# Patient Record
(demographics unavailable — no encounter records)

---

## 2024-11-04 NOTE — HISTORY OF PRESENT ILLNESS
[FreeTextEntry8] : 70 yo female presents for follow up of T2DM, HLD, hypothyroid, hepatic steatosis. reports feeling well. Denies fever, chills, cp, palpitations, sob, nvcd.

## 2024-12-18 NOTE — HISTORY OF PRESENT ILLNESS
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, does not have a car chart reviewed labs reviewed 11/2024 a1c 8.2, ldl 93, vit d 36, tsh 1.64, clara neg feels depressed bc of cardiac cath having some lip numbness on blood thinners recently likes the bobby but forgot the reader today  12/12th going to the hospital for cardiac catherization   regimen LT4 25mcg daily, taking consistently metformin 1000mg BID- no issues actos 30 mg daily- no issues farxiga 5mg daily- running low humalog 5 TID tresiba 20 units AM ONLY past meds off ozempic due to not feeling good did not want to use the vgo  FS in office: 149 bobby reader at home  diet: 830-9 has whole grain bread, lunch salad, snack with fruit or cheese stick, dinner salad/meat, does not eat out exercise: limited weight: stable  eye doctor: b/l DR siddiqi/ edema, gets laser treatments, 9/2024 neuropathy: yes, on gabapentin, sees podiatry CKD: no but has abnormal clara other (PVD/MI/CVA): none non smoker

## 2024-12-18 NOTE — ASSESSMENT
[Long Term Vascular Complications] : long term vascular complications of diabetes [Importance of Diet and Exercise] : importance of diet and exercise to improve glycemic control, achieve weight loss and improve cardiovascular health [Exercise/Effect on Glucose] : exercise/effect on glucose [Action and use of Insulin] : action and use of short and long-acting insulin [Insulin Self-Administration] : insulin self-administration [Retinopathy Screening] : Patient was referred to ophthalmology for retinopathy screening [FreeTextEntry1] : 69F, [place] speaking obese w/ longstanding T2DM cb b/l DR siddiqi/ edema/microalbuminemia/neuropathy/CAD, HLD, HTN, vitamin D deficiency, and hypothyroidism here for follow up needs a lot of education and repetition rtc in 3 months with sil rtc in 6 months with me can consider 1mg dst if has weight gain or still has fatigue Low Ferritin- defer to pmd  ***going for angiogram 12/12. The above patient has been under my care for the treatment of diabetes. She is stable from a diabetes standpoint to proceed with the planned procedure on 12/12. Recent blood glucose levels show that her diabetes is in good control. If there are any further questions, please call the office at [phone]. Patient was given instructions to take only tresiba 10 units night prior to procedure, hold farxiga for 3 days and not to take actos, metformin or humalog the day of the procedure.***  T2DM cb triopathy/CAD/hypoglycemia - suboptimal but no data to adjust, goal A1c<7 based on age Likely due to dietary indiscretion and nature of disease. MUST rotate sites- REMINDED AGAIN up to date with eye doctor on Alistair continue metformin 2000mg daily continue humalog 5 units before each meal continue tresiba 20 units AM ONLY continue actos 30mg daily due to post prandial highs and hepatic steatosis (in 2021 but looks like might have improved since then based on recent abdomen sono/ct), no hx or fhx of bladder cancer continue gabapentin 100/200mg for neuropathy needs to drink more fluids other does not want vgo had issues with ozempic, does not want to retry  Hypothyroidism- normal tsh. continue LT4 25mcg. 12/2021 thyroid ultrasound heterogenous, no need to repeat  HLD- ldl<70, on statin, defer to cardiology  HTN- BP acceptable. continue ACEi  vitamin D deficiency- monitor. normalized  vitamin B12 def- continue B12 1000mcg   ----------- This patient with diabetes - Is injecting insulin 3 or more times per day - Is currently on CGM, testing glucose continuously - Has been seen in the office by a provider within the last six months to review CGM data with their provider - Is adjusting multi-dose insulin daily using a sliding scale or correction factor as documented in the medical record CGM is medically necessary for this pt. - CGM will improve/maintain A1c - CGM will reduce hypoglycemic events Alistair and Dexcom each require one test strip daily to use in case of sensor failure or for blood glucose verification or during sensor warmup.

## 2024-12-18 NOTE — ASSESSMENT
[Long Term Vascular Complications] : long term vascular complications of diabetes [Importance of Diet and Exercise] : importance of diet and exercise to improve glycemic control, achieve weight loss and improve cardiovascular health [Exercise/Effect on Glucose] : exercise/effect on glucose [Action and use of Insulin] : action and use of short and long-acting insulin [Insulin Self-Administration] : insulin self-administration [Retinopathy Screening] : Patient was referred to ophthalmology for retinopathy screening [FreeTextEntry1] : 69F, Canadian speaking obese w/ longstanding T2DM cb b/l DR siddiqi/ edema/microalbuminemia/neuropathy/CAD, HLD, HTN, vitamin D deficiency, and hypothyroidism here for follow up needs a lot of education and repetition rtc in 3 months with sil rtc in 6 months with me can consider 1mg dst if has weight gain or still has fatigue Low Ferritin- defer to pmd  ***going for angiogram 12/12. The above patient has been under my care for the treatment of diabetes. She is stable from a diabetes standpoint to proceed with the planned procedure on 12/12. Recent blood glucose levels show that her diabetes is in good control. If there are any further questions, please call the office at [phone]. Patient was given instructions to take only tresiba 10 units night prior to procedure, hold farxiga for 3 days and not to take actos, metformin or humalog the day of the procedure.***  T2DM cb triopathy/CAD/hypoglycemia - suboptimal but no data to adjust, goal A1c<7 based on age Likely due to dietary indiscretion and nature of disease. MUST rotate sites- REMINDED AGAIN up to date with eye doctor on Alistair continue metformin 2000mg daily continue humalog 5 units before each meal continue tresiba 20 units AM ONLY continue actos 30mg daily due to post prandial highs and hepatic steatosis (in 2021 but looks like might have improved since then based on recent abdomen sono/ct), no hx or fhx of bladder cancer continue gabapentin 100/200mg for neuropathy needs to drink more fluids other does not want vgo had issues with ozempic, does not want to retry  Hypothyroidism- normal tsh. continue LT4 25mcg. 12/2021 thyroid ultrasound heterogenous, no need to repeat  HLD- ldl<70, on statin, defer to cardiology  HTN- BP acceptable. continue ACEi  vitamin D deficiency- monitor. normalized  vitamin B12 def- continue B12 1000mcg   ----------- This patient with diabetes - Is injecting insulin 3 or more times per day - Is currently on CGM, testing glucose continuously - Has been seen in the office by a provider within the last six months to review CGM data with their provider - Is adjusting multi-dose insulin daily using a sliding scale or correction factor as documented in the medical record CGM is medically necessary for this pt. - CGM will improve/maintain A1c - CGM will reduce hypoglycemic events Alistair and Dexcom each require one test strip daily to use in case of sensor failure or for blood glucose verification or during sensor warmup.

## 2024-12-18 NOTE — HISTORY OF PRESENT ILLNESS
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, does not have a car chart reviewed labs reviewed 11/2024 a1c 8.2, ldl 93, vit d 36, tsh 1.64, clara neg feels depressed bc of cardiac cath having some lip numbness on blood thinners recently likes the bobby but forgot the reader today  12/12th going to the hospital for cardiac catherization   regimen LT4 25mcg daily, taking consistently metformin 1000mg BID- no issues actos 30 mg daily- no issues farxiga 5mg daily- running low humalog 5 TID tresiba 20 units AM ONLY past meds off ozempic due to not feeling good did not want to use the vgo  FS in office: 149 bobby reader at home  diet: 830-9 has whole grain bread, lunch salad, snack with fruit or cheese stick, dinner salad/meat, does not eat out exercise: limited weight: stable  eye doctor: b/l DR siddiqi/ edema, gets laser treatments, 9/2024 neuropathy: yes, on gabapentin, sees podiatry CKD: no but has abnormal clara other (PVD/MI/CVA): none non smoker

## 2024-12-18 NOTE — HISTORY OF PRESENT ILLNESS
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, does not have a car chart reviewed labs reviewed 11/2024 a1c 8.2, ldl 93, vit d 36, tsh 1.64, clara neg feels depressed bc of cardiac cath having some lip numbness on blood thinners recently likes the bobby but forgot the reader today  12/12th going to the hospital for cardiac catherization   regimen LT4 25mcg daily, taking consistently metformin 1000mg BID- no issues actos 30 mg daily- no issues farxiga 5mg daily- running low humalog 5 TID tresiba 20 units AM ONLY past meds off ozempic due to not feeling good did not want to use the vgo  FS in office: 149 bobby reader at home  diet: 830-9 has whole grain bread, lunch salad, snack with fruit or cheese stick, dinner salad/meat, does not eat out exercise: limited weight: stable  eye doctor: b/l DR siddiqi/ edema, gets laser treatments, 9/2024 neuropathy: yes, on gabapentin, sees podiatry CKD: no but has abnormal clara other (PVD/MI/CVA): none non smoker

## 2024-12-18 NOTE — ASSESSMENT
[Long Term Vascular Complications] : long term vascular complications of diabetes [Importance of Diet and Exercise] : importance of diet and exercise to improve glycemic control, achieve weight loss and improve cardiovascular health [Exercise/Effect on Glucose] : exercise/effect on glucose [Action and use of Insulin] : action and use of short and long-acting insulin [Insulin Self-Administration] : insulin self-administration [Retinopathy Screening] : Patient was referred to ophthalmology for retinopathy screening [FreeTextEntry1] : 69F, Guatemalan speaking obese w/ longstanding T2DM cb b/l DR siddiqi/ edema/microalbuminemia/neuropathy/CAD, HLD, HTN, vitamin D deficiency, and hypothyroidism here for follow up needs a lot of education and repetition rtc in 3 months with sil rtc in 6 months with me can consider 1mg dst if has weight gain or still has fatigue Low Ferritin- defer to pmd  ***going for angiogram 12/12. The above patient has been under my care for the treatment of diabetes. She is stable from a diabetes standpoint to proceed with the planned procedure on 12/12. Recent blood glucose levels show that her diabetes is in good control. If there are any further questions, please call the office at [phone]. Patient was given instructions to take only tresiba 10 units night prior to procedure, hold farxiga for 3 days and not to take actos, metformin or humalog the day of the procedure.***  T2DM cb triopathy/CAD/hypoglycemia - suboptimal but no data to adjust, goal A1c<7 based on age Likely due to dietary indiscretion and nature of disease. MUST rotate sites- REMINDED AGAIN up to date with eye doctor on Alistair continue metformin 2000mg daily continue humalog 5 units before each meal continue tresiba 20 units AM ONLY continue actos 30mg daily due to post prandial highs and hepatic steatosis (in 2021 but looks like might have improved since then based on recent abdomen sono/ct), no hx or fhx of bladder cancer continue gabapentin 100/200mg for neuropathy needs to drink more fluids other does not want vgo had issues with ozempic, does not want to retry  Hypothyroidism- normal tsh. continue LT4 25mcg. 12/2021 thyroid ultrasound heterogenous, no need to repeat  HLD- ldl<70, on statin, defer to cardiology  HTN- BP acceptable. continue ACEi  vitamin D deficiency- monitor. normalized  vitamin B12 def- continue B12 1000mcg   ----------- This patient with diabetes - Is injecting insulin 3 or more times per day - Is currently on CGM, testing glucose continuously - Has been seen in the office by a provider within the last six months to review CGM data with their provider - Is adjusting multi-dose insulin daily using a sliding scale or correction factor as documented in the medical record CGM is medically necessary for this pt. - CGM will improve/maintain A1c - CGM will reduce hypoglycemic events Alistair and Dexcom each require one test strip daily to use in case of sensor failure or for blood glucose verification or during sensor warmup.

## 2024-12-18 NOTE — PHYSICAL EXAM
[Alert] : alert [Well Nourished] : well nourished [Obese] : obese [No Acute Distress] : no acute distress [Well Developed] : well developed [Normal Sclera/Conjunctiva] : normal sclera/conjunctiva [EOMI] : extra ocular movement intact [No Proptosis] : no proptosis [Normal Oropharynx] : the oropharynx was normal [Thyroid Not Enlarged] : the thyroid was not enlarged [No Thyroid Nodules] : no palpable thyroid nodules [No Respiratory Distress] : no respiratory distress [No Accessory Muscle Use] : no accessory muscle use [Clear to Auscultation] : lungs were clear to auscultation bilaterally [Normal S1, S2] : normal S1 and S2 [Normal Rate] : heart rate was normal [Regular Rhythm] : with a regular rhythm [No Edema] : no peripheral edema [Pedal Pulses Normal] : the pedal pulses are present [Normal Bowel Sounds] : normal bowel sounds [Not Tender] : non-tender [Not Distended] : not distended [Soft] : abdomen soft [No Stigmata of Cushings Syndrome] : no stigmata of Cushings Syndrome [Normal Gait] : normal gait [Normal Strength/Tone] : muscle strength and tone were normal [No Rash] : no rash [Normal Reflexes] : deep tendon reflexes were 2+ and symmetric [No Tremors] : no tremors [Oriented x3] : oriented to person, place, and time [Normal Affect] : the affect was normal [Normal Mood] : the mood was normal [Acanthosis Nigricans] : no acanthosis nigricans [Delayed in the Right Toes] : normal in the toes [Delayed in the Left Toes] : normal in the toes

## 2025-02-07 NOTE — HISTORY OF PRESENT ILLNESS
[FreeTextEntry1] : Interval history: Hospitalized 12/2024 and has 3 new cardiac stents  : Zachariah [id number]   type: 2 diabetes since: >20 years number of years on insulin: since 2010 diagnosed by: routine labs current severity: uncontrolled FH of diabetes: sister, brother complications: neuropathy  regimen metformin 1000mg BID- no issues pioglitazone 30 mg daily tresiba 20 units QHS- doesnt take if her bedtime blood sugar is under 150, happens 3x a week Humalog 4 units pre meal-often forgets and takes it mid meal or after she eats Farxiga 5 mg daily  LT4 25mcg daily, taking consistently- TFTs WNL  FS in office: 179- coffee and donut (never makes choices like this per pt)  FS checks 2x daily Using bobby Average glucose 167 GMI 8.3% GV 33%   Very high 9% High 26% In range 64% Low 1% Very low 0%  7/2024- HGA1C 8.9- worse  however bobby projection already better 7.3 GMI  diet: typically has toast with pb for bfast lunch: salad dinner: fish, grilled chicken, meat, veggies, no rice  drinks; drinks water  exercise: limited  weight: relatively stable  eye doctor: b/l DR w/ edema, gets laser treatments, 2/2025  neuropathy: yes, sees podiatry  CKD: no but has abnormal clara  other (PVD/MI/CVA): none  non smoker

## 2025-02-07 NOTE — REVIEW OF SYSTEMS
[Fatigue] : no fatigue [Recent Weight Gain (___ Lbs)] : no recent weight gain [Recent Weight Loss (___ Lbs)] : no recent weight loss [Visual Field Defect] : no visual field defect [Blurred Vision] : no blurred vision [Dysphagia] : no dysphagia [Neck Pain] : no neck pain [Dysphonia] : no dysphonia [Chest Pain] : no chest pain [Shortness Of Breath] : no shortness of breath [Constipation] : no constipation [Diarrhea] : no diarrhea [Polyuria] : no polyuria [Joint Pain] : no joint pain [Polydipsia] : no polydipsia

## 2025-02-07 NOTE — ASSESSMENT
[FreeTextEntry1] :   Diabetes Counseling: The patient was counseled on long term vascular complications of diabetes, importance of diet and exercise to improve glycemic control, achieve weight loss and improve cardiovascular health, exercise/effect on glucose, action and use of short and long-acting insulin and insulin self-administration. Patient was referred to ophthalmology for retinopathy screening.  69F, Maldivian speaking obese w/ longstanding T2DM cb b/l DR w/ edema/microalbuminemia/neuropathy, HLD, HTN, vitamin D deficiency, and hypothyroidism here for follow up   T2DM cb triopathy, hypoglycemia - improved! goal A1c<7 based on age Likely due to dietary indiscretion and nature of disease.  MUST rotate sites  up to date with eye doctor  - continue metformin 2000mg daily  -Decrease tresiba 15 units QHS but must take 100% of the time  -Continue Humalog 4 units pre meal- 5-10 mins pre meal   -Continue Farxiga 5 mg daily in AM, must drink plenty of water   -Continue alistair and come in for download in 2 weeks so we can see if she needs dose changes  Continue actos 30 mg daily due to post prandial highs and hepatic steatosis (in 2021 but looks like might have improved since then based on recent abdomen sono/ct), no hx or fhx of bladder cancer  continue gabapentin for neuropathy  needs to drink more fluids    Hypothyroidism- normal tsh. continue LT4 25mcg. 12/2021 thyroid ultrasound heterogenous, no need to repeat    HLD- ldl slightly above <100, on statin. Pt to bring these results to her cardiologist who she is closely following with s/p 3 stents    HTN- BP acceptable. continue ACEi    vitamin D deficiency- no updated levels with labs    vitamin B12 def- continue B12 1000mcg, no updated levels with labs         With regards to Dexcom/Alistair:  Dx: E11.65 This patient with diabetes performs 4 glucose checks per day for at least the last 60 days utilizing a home blood glucose monitor. The patient is treated with insulin via injection daily (or a continuous subcutaneous insulin infusion pump). This patient uses the  glucose monitor readings to frequently adjust their insulin treatment based on set doses adjusted as needed by the physician. In addition, the patient has been to our office for an evaluation of their diabetes control within the past 6 months.    RTO 6-8 weeks NP

## 2025-02-17 NOTE — PHYSICAL EXAM
[No CVA Tenderness] : no CVA  tenderness [No Spinal Tenderness] : no spinal tenderness [Normal] : affect was normal and insight and judgment were intact [de-identified] : mild mod paraspinal tenderness

## 2025-02-17 NOTE — ASSESSMENT
[FreeTextEntry1] : Annual physical: f/u routine labwork HTN: bp reviewed, recommend low salt diet, wt loss, exercise, DASH diet, c/w current regimen Chronic low back pain: hx of sciatica, recommend evaluation with orthopedist Colon polyps: f/u GI for repeat colonoscopy HLD: start rosuvastatin 20 mg po daily, repeat in 3 months, Recommend low fat diet, wt loss, exercise, and DASH diet. Decreased ferritin: f/u repeat iron studies ordered Hepatic steatosis: LFTs wnl, Recommend low fat diet, wt loss, exercise, and DASH diet, avoid excess alcohol/tylenol, repeat in 3 months Hypothyroid: c/w levothyroxine 25 mcg po daily Microalbuminuria: c/w enalapril as prescribed Osteopenia: recommend low-mod int stretching/exercise, repeat in 1 year renal cyst: asymp, will ct monitor T2DM: c/w current regimen, low carb diet, exercise, f/u endocrinology, f/u ophthalmology B12 def: b12 level 11/24 reviewed CAD: s/p 2 stents in 12/24, c/w current regimen including plavix 75 mg po daily, statin therapy, f/u cardiology Varicose veins: recommend leg exercises, compression socks, f/u vascular sx Gastritis/GERD: c/w famotidine 20 mg po daily prn, f/u GI, avoid late night meals/snacks (2 - 3 hours before bedtime), avoid smoking, avoid tight clothing especially around stomach area, avoid foods that worsen symptoms which can include coffee, chocolate, alcohol, peppermint, and fatty foods. RTC 3 wks

## 2025-02-17 NOTE — HISTORY OF PRESENT ILLNESS
[FreeTextEntry1] : LI [de-identified] : 68 yo female presents with complaint of chronic low back pain, she is requesting to see orthopedist. Denies fever, chills, cp, palpitations, sob, nvcd.

## 2025-02-17 NOTE — HEALTH RISK ASSESSMENT
[Good] : ~his/her~  mood as  good [1 or 2 (0 pts)] : 1 or 2 (0 points) [Never (0 pts)] : Never (0 points) [No] : In the past 12 months have you used drugs other than those required for medical reasons? No [No falls in past year] : Patient reported no falls in the past year [0] : 2) Feeling down, depressed, or hopeless: Not at all (0) [PHQ-2 Negative - No further assessment needed] : PHQ-2 Negative - No further assessment needed [Audit-CScore] : 0 [de-identified] : needs improvement [de-identified] : needs improvement [[id number]] : 0 [Former] : Former [5-9] : 5-9 [> 15 Years] : > 15 Years [de-identified] : 10 yrs, 1/2 ppd, quit 25 years ago [Patient reported mammogram was normal] : Patient reported mammogram was normal [Patient declined PAP Smear] : Patient declined PAP Smear [Patient reported bone density results were abnormal] : Patient reported bone density results were abnormal [Patient reported colonoscopy was abnormal] : Patient reported colonoscopy was abnormal [HIV test declined] : HIV test declined [Hepatitis C test declined] : Hepatitis C test declined [With Family] : lives with family [Employed] : employed [Single] : single [Sexually Active] : not sexually active [High Risk Behavior] : no high risk behavior [Feels Safe at Home] : Feels safe at home [Fully functional (bathing, dressing, toileting, transferring, walking, feeding)] : Fully functional (bathing, dressing, toileting, transferring, walking, feeding) [Fully functional (using the telephone, shopping, preparing meals, housekeeping, doing laundry, using] : Fully functional and needs no help or supervision to perform IADLs (using the telephone, shopping, preparing meals, housekeeping, doing laundry, using transportation, managing medications and managing finances) [Reports changes in hearing] : Reports no changes in hearing [Reports changes in vision] : Reports no changes in vision [Reports changes in dental health] : Reports no changes in dental health [MammogramDate] : 06/24 [BoneDensityDate] : 03/24 [BoneDensityComments] : osteopenia [ColonoscopyDate] : 05/23 [ColonoscopyComments] : polyps

## 2025-02-21 NOTE — PHYSICAL EXAM
[de-identified] : Lumbar Exam  CONSTITUTIONAL: The patient is a very pleasant individual who is well-nourished and who appears stated age. PSYCHIATRIC: The patient is alert and oriented X 3 and in no apparent distress, and participates with orthopedic evaluation well. HEAD: Atraumatic and is nonsyndromic in appearance. EENT: No visible thyromegaly, EOMI. RESPIRATORY: Respiratory rate is regular, not dyspneic on examination. LYMPHATICS: There is no inguinal lymphadenopathy INTEGUMENTARY: Skin is clean, dry, and intact about the bilateral lower extremities and lumbar spine. VASCULAR: There is brisk capillary refill about the bilateral lower extremities. NEUROLOGIC: There are no pathologic reflexes. There is no decrease in sensation of the bilateral lower extremities on Wartenberg pinwheel examination. Deep tendon reflexes are well maintained at 2+/4 of the bilateral lower extremities and are symmetric. MUSCULOSKELETAL: There is no visible muscular atrophy. Manual motor strength is well maintained in the bilateral lower extremities. Range of motion of lumbar spine is well maintained. The patient ambulates in a non-myelopathic manner. Negative tension sign and straight leg raise bilaterally. Quad extension, ankle dorsiflexion, EHL, plantar flexion, and ankle eversion are well preserved. Normal secondary orthopaedic exam of bilateral hips, greater trochanteric area, knees and ankles.  Tenderness ovation along the right thoracolumbar paraspinal musculature. [de-identified] : X-ray 2 views lumbar spine obtained reviewed in the office today demonstrating chronic L1 compression fracture with exaggeration of lumbar lordosis and straightening of thoracic kyphosis.  Pedicles are well-visualized.  No acute osseous abnormalities noted.  Previous MRI/CT of the lumbar spine obtained on 10/21/2023 at [facility] and reviewed in the office today demonstrating L1 chronic compression fracture without retropulsion.  There is no areas of critical stenosis noted throughout the lumbar spine.  No other acute osseous abnormalities noted.  There is paraspinal muscle atrophy noted.
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
## 2025-03-28 NOTE — ASSESSMENT
[FreeTextEntry1] :   Diabetes Counseling: The patient was counseled on long term vascular complications of diabetes, importance of diet and exercise to improve glycemic control, achieve weight loss and improve cardiovascular health, exercise/effect on glucose, action and use of short and long-acting insulin and insulin self-administration. Patient was referred to ophthalmology for retinopathy screening.  69F, Guyanese speaking obese w/ longstanding T2DM cb b/l DR w/ edema/microalbuminemia/neuropathy, HLD, HTN, vitamin D deficiency, and hypothyroidism here for follow up   T2DM cb triopathy, hypoglycemia - improved! goal A1c<7 based on age Likely due to dietary indiscretion and nature of disease.  MUST rotate sites  up to date with eye doctor  - continue metformin 2000mg daily  -Continue tresiba 15 units QHS but must take 100% of the time  -Continue Humalog 4 units pre meal- 5-10 mins pre meal 100% of the time  -Continue Farxiga 5 mg daily in AM, must drink plenty of water   -Continue alistair and come in for download in 2 weeks so we can see if she needs dose changes. Must confirm lows/highs with finger stick. New meter, strips, lancets sent to pharmacy.  Continue actos 30 mg daily due to post prandial highs and hepatic steatosis (in 2021 but looks like might have improved since then based on recent abdomen sono/ct), no hx or fhx of bladder cancer  continue gabapentin for neuropathy  needs to drink more fluids    Hypothyroidism- normal tsh. continue LT4 25mcg. 12/2021 thyroid ultrasound heterogenous, no need to repeat    HLD- ldl slightly above <100, on statin    HTN- BP acceptable. continue ACEi    vitamin D deficiency- no updated levels with labs    vitamin B12 def- continue B12 1000mcg, no updated levels with labs         With regards to Dexcom/Alistair:  Dx: E11.65 This patient with diabetes performs 4 glucose checks per day for at least the last 60 days utilizing a home blood glucose monitor. The patient is treated with insulin via injection daily (or a continuous subcutaneous insulin infusion pump). This patient uses the  glucose monitor readings to frequently adjust their insulin treatment based on set doses adjusted as needed by the physician. In addition, the patient has been to our office for an evaluation of their diabetes control within the past 6 months.    RTO 6-8 weeks NP, fasting labs before next visit

## 2025-03-28 NOTE — HISTORY OF PRESENT ILLNESS
[FreeTextEntry1] : Interval history: Is completing cardiac rehab for 26 weeks  : Jennifer [id number]   type: 2 diabetes since: >20 years number of years on insulin: since 2010 diagnosed by: routine labs current severity: uncontrolled FH of diabetes: sister, brother complications: neuropathy  regimen metformin 1000mg BID- no issues pioglitazone 30 mg daily tresiba 15 units QHS- had to hold this 2x due to low blood sugars  Humalog 4 units pre meal-often forgets and takes it mid meal or after she eats Farxiga 5 mg daily  LT4 25mcg daily, taking consistently- TFTs WNL  FS in office: 261- forgot to take her insulin before bfast because she was rushing to get to apt (states she never does this)- had 2 toast with cream cheese and one egg   FS checks 2x daily Using bobby Average glucose 174 GMI 7.5% GV 25.4%   Very high 6% High 33% In range 61% Low 0% Very low 0%  Needs to start confirming low blood sugars with a finger stick  2/2025- HGA1C 8.9- worse  however bobby projection already better 7.5 GMI  diet: typically has toast with pb for bfast lunch: salad dinner: fish, grilled chicken, meat, veggies, no rice  drinks; drinks water  exercise: limited  weight: relatively stable  eye doctor: morelia/l DR w/ edema, gets laser treatments, LV 2/2025  neuropathy: yes, sees podiatry  CKD: no but has abnormal clara  other (PVD/MI/CVA): none  non smoker
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
## 2025-04-01 NOTE — DISCUSSION/SUMMARY
[de-identified] : 30 minutes was spent reviewing the x-rays as well as discussing with the patient their clinical presentation, diagnosis and providing education.  Conservative treatment was discussed with the patient at length. Anticipatory guidance regarding disease process L1 compression fracture with focal kyphosis, avoidance of acute exacerbation this was discussed at length and all patients commenting concerns were answered to the patient's satisfaction.  Patient is improving with physical therapy regarding her back pain.  Physical therapy for decrease pain and increase function was ordered to continue. Patient was given home exercises as approved by [employer] and works well held directed toward this particular process. Intermittent use of acetaminophen 500 mg 2 tablets t.i.d. p.r.n. pain.  Home exercise including stretching on a daily basis for 20-30 minutes was recommended. Heat, ice, topical were discussed as needed. The patient will followup in 1 year or as needed at which point in time if symptoms continue we will order lumbar MRI studies to guide treatment plan including possible injection therapy with pain management versus surgical option.

## 2025-04-01 NOTE — PHYSICAL EXAM
[de-identified] : CONSTITUTIONAL: The patient is a very pleasant individual who is well-nourished and who appears stated age. PSYCHIATRIC: The patient is alert and oriented X 3 and in no apparent distress, and participates with orthopedic evaluation well. HEAD: Atraumatic and is nonsyndromic in appearance. EENT: No visible thyromegaly, EOMI. RESPIRATORY: Respiratory rate is regular, not dyspneic on examination. LYMPHATICS: There is no inguinal lymphadenopathy INTEGUMENTARY: Skin is clean, dry, and intact about the bilateral lower extremities and lumbar spine. VASCULAR: There is brisk capillary refill about the bilateral lower extremities. NEUROLOGIC: There are no pathologic reflexes. There is no decrease in sensation of the bilateral lower extremities on Wartenberg pinwheel examination. Deep tendon reflexes are well maintained at 2+/4 of the bilateral lower extremities and are symmetric. MUSCULOSKELETAL: There is no visible muscular atrophy. Manual motor strength is well maintained in the bilateral lower extremities. Range of motion of lumbar spine is well maintained. The patient ambulates in a non-myelopathic manner. Negative tension sign and straight leg raise bilaterally. Quad extension, ankle dorsiflexion, EHL, plantar flexion, and ankle eversion are well preserved. Normal secondary orthopaedic exam of bilateral hips, greater trochanteric area, knees and ankles. Tenderness ovation along the right thoracolumbar paraspinal musculature.    Imaging: X-ray 2 views lumbar spine obtained reviewed in the office today demonstrating chronic L1 compression fracture with exaggeration of lumbar lordosis and straightening of thoracic kyphosis. Pedicles are well-visualized. No acute osseous abnormalities noted.  [de-identified] : Previous MRI/CT of the lumbar spine obtained on 10/21/2023 at [facility] and reviewed in the office today demonstrating L1 chronic compression fracture without retropulsion. There is no areas of critical stenosis noted throughout the lumbar spine. No other acute osseous abnormalities noted. There is paraspinal muscle atrophy noted.   X-ray 2 views lumbar spine obtained reviewed in the office February 21, 2025 demonstrating chronic L1 compression fracture with exaggeration of lumbar lordosis and straightening of thoracic kyphosis. Pedicles are well-visualized. No acute osseous abnormalities noted.

## 2025-06-06 NOTE — ASSESSMENT
[FreeTextEntry1] :   Diabetes Counseling: The patient was counseled on long term vascular complications of diabetes, importance of diet and exercise to improve glycemic control, achieve weight loss and improve cardiovascular health, exercise/effect on glucose, action and use of short and long-acting insulin and insulin self-administration. Patient was referred to ophthalmology for retinopathy screening.  70F, Israeli speaking obese w/ longstanding T2DM cb b/l  w/ edema/microalbuminemia/neuropathy, HLD, HTN, vitamin D deficiency, and hypothyroidism here for follow up  MUST rotate sites  - continue metformin 2000mg daily  -Continue tresiba 15 units QHS but must take 100% of the time  -Continue Humalog 4 units pre meal- 5-10 mins pre meal 100% of the time  -Continue Farxiga 5 mg daily in AM, must drink plenty of water   Continue actos 30 mg daily due to post prandial highs and hepatic steatosis (in 2021 but looks like might have improved since then based on recent abdomen sono/ct), no hx or fhx of bladder cancer  -Continue alistair and come in for download in 2 weeks so we can see if she needs dose changes. Must confirm lows/highs with finger stick. New meter, strips, lancets sent to pharmacy.  continue gabapentin for neuropathy  needs to drink more fluids  must go for daily walks! increase exercise as tolerated  continue annual optho exams    Hypothyroidism- normal tsh. continue LT4 25mcg. 12/2021 thyroid ultrasound heterogenous, no need to repeat    HLD- ldl excellent, on statin    HTN- BP acceptable. continue ACEi    vitamin D deficiency- no updated levels with labs    vitamin B12 def- decrease supplement to QOD, high on labs      With regards to Dexcom/Alistair:  Dx: E11.65 This patient with diabetes performs 4 glucose checks per day for at least the last 60 days utilizing a home blood glucose monitor. The patient is treated with insulin via injection daily (or a continuous subcutaneous insulin infusion pump). This patient uses the  glucose monitor readings to frequently adjust their insulin treatment based on set doses adjusted as needed by the physician. In addition, the patient has been to our office for an evaluation of their diabetes control within the past 6 months.   Pt to drop off alistair reader in several weeks after implementing her exercise practices so we evaluate see improvement  RTO 3 months MD, fasting labs before next visit

## 2025-06-06 NOTE — HISTORY OF PRESENT ILLNESS
[FreeTextEntry1] : Interval history: No events, is lonely and stressed A1C worse- blames she isnt moving her body, sits all day but state she barely eats  : Ray [id number]   type: 2 diabetes since: >20 years number of years on insulin: since 2010 diagnosed by: routine labs current severity: uncontrolled FH of diabetes: sister, brother complications: neuropathy  regimen metformin 1000mg BID- no issues pioglitazone 30 mg daily tresiba 15 units QHS Humalog 5 units pre meal-often forgets and takes it mid meal or after she eats, states she is compliant 80 % of the time Farxiga 5 mg daily  past meds off ozempic due to not feeling good did not want to use the vgo  LT4 25mcg daily, taking consistently- TFTs WNL  FS in office: 221- eggs, spinach and coffee but forgot her insulin  FS checks 2x daily Using bobby Average glucose 202 GMI 8.1% GV 26.8%   Very high 20% High 38% In range 42% Low 0% Very low 0%  Needs to start confirming low blood sugars with a finger stick  5/2025- HGA1C 9.2- worse   diet: typically has toast with pb for bfast lunch: salad dinner: fish, grilled chicken, meat, veggies, no rice  drinks; drinks water  exercise: limited  weight: relatively stable  eye doctor: morelia/l DR w/ edema, gets laser treatments, LV 5/2025  neuropathy: yes, sees podiatry  CKD: no but has abnormal clara  other (PVD/MI/CVA): none  non smoker